# Patient Record
Sex: MALE | Race: WHITE
[De-identification: names, ages, dates, MRNs, and addresses within clinical notes are randomized per-mention and may not be internally consistent; named-entity substitution may affect disease eponyms.]

---
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
## 2024-01-05 ENCOUNTER — NON-APPOINTMENT (OUTPATIENT)
Age: 24
End: 2024-01-05

## 2024-01-05 ENCOUNTER — APPOINTMENT (OUTPATIENT)
Dept: ORTHOPEDIC SURGERY | Facility: CLINIC | Age: 24
End: 2024-01-05
Payer: COMMERCIAL

## 2024-01-05 VITALS — BODY MASS INDEX: 26.6 KG/M2 | HEIGHT: 71 IN | WEIGHT: 190 LBS

## 2024-01-05 DIAGNOSIS — S69.91XA UNSPECIFIED INJURY OF RIGHT WRIST, HAND AND FINGER(S), INITIAL ENCOUNTER: ICD-10-CM

## 2024-01-05 PROCEDURE — 99203 OFFICE O/P NEW LOW 30 MIN: CPT

## 2024-01-05 PROCEDURE — 73110 X-RAY EXAM OF WRIST: CPT | Mod: RT

## 2024-01-05 NOTE — DISCUSSION/SUMMARY
[de-identified] : Right wrist sprain likely dorsal capsular sprain.  He has no snuffbox tenderness.  He is tender over the dorsal capsule and DRUJ stretching exercises activity modification discussed time course of healing consider advanced imaging if not improved all questions answered follow-up as needed

## 2024-01-05 NOTE — HISTORY OF PRESENT ILLNESS
[de-identified] : 23-year-old right-hand-dominant male right wrist pain 1 month.  He was doing exercises at the gym lifting weights when he felt pain in the dorsal aspect of his right wrist this has been improving slightly.  He denies any prior injury he is right-hand dominant it has been improving and has been returning to the gym of the limiting his weight at this time his pain is worse with wrist extension. The patient's past medical history, past surgical history, medications, allergies, and social history were reviewed by me today with the patient and documented accordingly. In addition, the patient's family history, which is noncontributory to this visit, was also reviewed.

## 2024-01-05 NOTE — PHYSICAL EXAM
[de-identified] : General Exam  Well developed, well nourished No apparent distress Oriented to person, place, and time Mood: Normal Affect: Normal Balance and coordination: Normal Gait: Normal  R wrist exam  Skin: Clean, dry, intact. No ecchymosis. No swelling. No palpable deformity. No crepitus Tenderness: No tenderness to palpation at the scapholunate interval. No snuffbox tenderness. No ttp at the distal radius, distal ulna, there is some tenderness at the DRUJ. Tenderness along the dorsal ulnar aspect metacarpophalangeal joint. ROM: Full range of motion of all digits normal cascade Painful ROM: None Neuro: Negative tinels over median nerve, AIN/PIN/Ulnar nerve in tact to motor/sensation. Strength: 5/5  5/5 wrist flexion, 5/5 wrist extension, 5/5 supination, 5/5 pronation Sensation: In tact to light touch throughout in medial, ulnar, and radial nerve distributions Vasc: 2+ radial pulse, <2s cap refill throughout  [de-identified] :  The following radiographs were ordered and read by me during this patients visit. I reviewed each radiograph in detail with the patient and discussed the findings as highlighted below.   3 views right wrist were obtained today there is no fracture or dislocation.  Joint spaces well-maintained